# Patient Record
Sex: FEMALE | Race: WHITE | Employment: FULL TIME | URBAN - METROPOLITAN AREA
[De-identification: names, ages, dates, MRNs, and addresses within clinical notes are randomized per-mention and may not be internally consistent; named-entity substitution may affect disease eponyms.]

---

## 2018-10-31 PROCEDURE — 87081 CULTURE SCREEN ONLY: CPT | Performed by: EMERGENCY MEDICINE

## 2018-11-08 ENCOUNTER — OFFICE VISIT (OUTPATIENT)
Dept: OBGYN CLINIC | Facility: CLINIC | Age: 23
End: 2018-11-08
Payer: COMMERCIAL

## 2018-11-08 VITALS
SYSTOLIC BLOOD PRESSURE: 120 MMHG | HEART RATE: 92 BPM | HEIGHT: 69 IN | BODY MASS INDEX: 28.44 KG/M2 | DIASTOLIC BLOOD PRESSURE: 68 MMHG | WEIGHT: 192 LBS

## 2018-11-08 DIAGNOSIS — Z12.4 CERVICAL CANCER SCREENING: ICD-10-CM

## 2018-11-08 DIAGNOSIS — Z01.419 ENCOUNTER FOR WELL WOMAN EXAM WITH ROUTINE GYNECOLOGICAL EXAM: Primary | ICD-10-CM

## 2018-11-08 PROCEDURE — 87491 CHLMYD TRACH DNA AMP PROBE: CPT | Performed by: OBSTETRICS & GYNECOLOGY

## 2018-11-08 PROCEDURE — 87591 N.GONORRHOEAE DNA AMP PROB: CPT | Performed by: OBSTETRICS & GYNECOLOGY

## 2018-11-08 PROCEDURE — 88175 CYTOPATH C/V AUTO FLUID REDO: CPT | Performed by: OBSTETRICS & GYNECOLOGY

## 2018-11-08 PROCEDURE — 99385 PREV VISIT NEW AGE 18-39: CPT | Performed by: OBSTETRICS & GYNECOLOGY

## 2018-11-08 RX ORDER — NORETHINDRONE ACETATE AND ETHINYL ESTRADIOL 1MG-20(21)
1 KIT ORAL DAILY
Qty: 3 PACKAGE | Refills: 3 | Status: SHIPPED | OUTPATIENT
Start: 2018-11-08 | End: 2019-11-20 | Stop reason: ALTCHOICE

## 2018-11-09 NOTE — PROGRESS NOTES
Eben Aguilar is a 24year old female [facility] Patient's last menstrual period was 10/21/2018 (exact date). Patient presents with:  Wellness Visit  Contraception: options  . Patient has no complaints, would like to start OCP, never had pap smear befo Bike Helmet: Not Asked        Seat Belt: Yes        Self-Exams: Not Asked    Social History Narrative      Not on file      FAMILY HISTORY:  Family History   Problem Relation Age of Onset   • Hypertension Father    • Colon Cancer Mother    • Dementia Mater distribution, and no lesions  Urethral Meatus:  normal in size, location, without lesions and prolapse  Bladder:  No fullness, masses or tenderness  Vagina:  Normal appearance without lesions, no abnormal discharge  Cervix:  Normal without tenderness on mo

## 2018-12-19 ENCOUNTER — OFFICE VISIT (OUTPATIENT)
Dept: OBGYN CLINIC | Facility: CLINIC | Age: 23
End: 2018-12-19
Payer: COMMERCIAL

## 2018-12-19 VITALS
SYSTOLIC BLOOD PRESSURE: 106 MMHG | HEART RATE: 88 BPM | BODY MASS INDEX: 27.85 KG/M2 | RESPIRATION RATE: 16 BRPM | DIASTOLIC BLOOD PRESSURE: 60 MMHG | HEIGHT: 69 IN | WEIGHT: 188 LBS

## 2018-12-19 DIAGNOSIS — Z30.41 SURVEILLANCE OF PREVIOUSLY PRESCRIBED CONTRACEPTIVE PILL: Primary | ICD-10-CM

## 2018-12-19 DIAGNOSIS — Z30.09 BIRTH CONTROL COUNSELING: ICD-10-CM

## 2018-12-19 PROCEDURE — 99213 OFFICE O/P EST LOW 20 MIN: CPT | Performed by: NURSE PRACTITIONER

## 2018-12-19 NOTE — PROGRESS NOTES
Sean Barillas is a 24year old female. Patient's last menstrual period was 12/12/2018 (approximate). Patient presents with:  Consult: discuss irregular spotting, just started the bc pack x 1 month.          HPI:    Pt was started on OCPs about 1 affect appropriate    IMPRESSION:   Surveillance of previously prescribed contraceptive pill  (primary encounter diagnosis)  Birth control counseling     PLAN:   Discussed Depo Provera, IUD and implant. Discussed risks, benefits, side effects.  Pt would lik

## 2019-01-09 ENCOUNTER — OFFICE VISIT (OUTPATIENT)
Dept: OBGYN CLINIC | Facility: CLINIC | Age: 24
End: 2019-01-09
Payer: COMMERCIAL

## 2019-01-09 VITALS
WEIGHT: 189.88 LBS | BODY MASS INDEX: 28.12 KG/M2 | SYSTOLIC BLOOD PRESSURE: 110 MMHG | DIASTOLIC BLOOD PRESSURE: 78 MMHG | HEART RATE: 64 BPM | RESPIRATION RATE: 14 BRPM | HEIGHT: 69 IN

## 2019-01-09 DIAGNOSIS — Z30.017 INSERTION OF IMPLANTABLE SUBDERMAL CONTRACEPTIVE: ICD-10-CM

## 2019-01-09 DIAGNOSIS — Z30.017 NEXPLANON INSERTION: Primary | ICD-10-CM

## 2019-01-09 LAB — CONTROL LINE PRESENT WITH A CLEAR BACKGROUND (YES/NO): YES YES/NO

## 2019-01-09 PROCEDURE — 81025 URINE PREGNANCY TEST: CPT | Performed by: OBSTETRICS & GYNECOLOGY

## 2019-01-09 PROCEDURE — 11981 INSERTION DRUG DLVR IMPLANT: CPT | Performed by: OBSTETRICS & GYNECOLOGY

## 2019-01-09 NOTE — PROGRESS NOTES
Nexplanon Insertion    Pregnancy Results: negative from urine test   Birth control method(s) used:  OCP  Consent was obtained from the patient. Insertion:  Lot Number   The patient was positioned with her left arm flexed.      2% lidocaine with epinephri

## 2019-11-20 ENCOUNTER — OFFICE VISIT (OUTPATIENT)
Dept: OBGYN CLINIC | Facility: CLINIC | Age: 24
End: 2019-11-20
Payer: COMMERCIAL

## 2019-11-20 VITALS
HEART RATE: 87 BPM | WEIGHT: 202 LBS | HEIGHT: 69 IN | SYSTOLIC BLOOD PRESSURE: 112 MMHG | BODY MASS INDEX: 29.92 KG/M2 | DIASTOLIC BLOOD PRESSURE: 80 MMHG

## 2019-11-20 DIAGNOSIS — Z01.419 ENCOUNTER FOR WELL WOMAN EXAM WITH ROUTINE GYNECOLOGICAL EXAM: Primary | ICD-10-CM

## 2019-11-20 DIAGNOSIS — Z12.4 CERVICAL CANCER SCREENING: ICD-10-CM

## 2019-11-20 DIAGNOSIS — N89.8 VAGINAL ODOR: ICD-10-CM

## 2019-11-20 PROCEDURE — 88175 CYTOPATH C/V AUTO FLUID REDO: CPT | Performed by: OBSTETRICS & GYNECOLOGY

## 2019-11-20 PROCEDURE — 87480 CANDIDA DNA DIR PROBE: CPT | Performed by: OBSTETRICS & GYNECOLOGY

## 2019-11-20 PROCEDURE — 87491 CHLMYD TRACH DNA AMP PROBE: CPT | Performed by: OBSTETRICS & GYNECOLOGY

## 2019-11-20 PROCEDURE — 87660 TRICHOMONAS VAGIN DIR PROBE: CPT | Performed by: OBSTETRICS & GYNECOLOGY

## 2019-11-20 PROCEDURE — 87510 GARDNER VAG DNA DIR PROBE: CPT | Performed by: OBSTETRICS & GYNECOLOGY

## 2019-11-20 PROCEDURE — 99395 PREV VISIT EST AGE 18-39: CPT | Performed by: OBSTETRICS & GYNECOLOGY

## 2019-11-20 PROCEDURE — 87591 N.GONORRHOEAE DNA AMP PROB: CPT | Performed by: OBSTETRICS & GYNECOLOGY

## 2019-11-20 NOTE — PROGRESS NOTES
Alis Guerrero is a 21year old female  No LMP recorded. Patient has had an implant. Patient presents with:  Wellness Visit  .   Patient noticed fishy odor when urinates for the past month, no urgency or frequency, no vaginal itching  OBSTETRI file        Active member of club or organization: Not on file        Attends meetings of clubs or organizations: Not on file        Relationship status: Not on file      Intimate partner violence:        Fear of current or ex partner: Not on file        E anemia, easy bruising or bleeding.       PHYSICAL EXAM:   Constitutional: well developed, well nourished  Head/Face: normocephalic  Neck/Thyroid: thyroid symmetric, no thyromegaly, no nodules, no adenopathy  Lymphatic:no abnormal supraclavicular or axillary

## 2019-11-23 ENCOUNTER — TELEPHONE (OUTPATIENT)
Dept: OBGYN CLINIC | Facility: CLINIC | Age: 24
End: 2019-11-23

## 2019-11-23 NOTE — TELEPHONE ENCOUNTER
Patient calling for test results from 11/20 pt states she was unable to view them on [employer]

## 2019-11-25 NOTE — TELEPHONE ENCOUNTER
Spoke with patient and went over labs done on 11/20. All labs we normal but patient still experiencing symptoms. Before we consider it to be the nexplanon implant  try OTC feminine hygiene product to stabilize vaginal pH.  If symptoms persist to call office

## 2020-09-01 ENCOUNTER — OFFICE VISIT (OUTPATIENT)
Dept: OBGYN CLINIC | Facility: CLINIC | Age: 25
End: 2020-09-01

## 2020-09-01 VITALS
WEIGHT: 199 LBS | SYSTOLIC BLOOD PRESSURE: 107 MMHG | HEART RATE: 72 BPM | DIASTOLIC BLOOD PRESSURE: 72 MMHG | HEIGHT: 69 IN | BODY MASS INDEX: 29.47 KG/M2

## 2020-09-01 DIAGNOSIS — N76.0 VAGINITIS AND VULVOVAGINITIS: Primary | ICD-10-CM

## 2020-09-01 PROCEDURE — 3074F SYST BP LT 130 MM HG: CPT | Performed by: OBSTETRICS & GYNECOLOGY

## 2020-09-01 PROCEDURE — 3078F DIAST BP <80 MM HG: CPT | Performed by: OBSTETRICS & GYNECOLOGY

## 2020-09-01 PROCEDURE — 87510 GARDNER VAG DNA DIR PROBE: CPT | Performed by: OBSTETRICS & GYNECOLOGY

## 2020-09-01 PROCEDURE — 87591 N.GONORRHOEAE DNA AMP PROB: CPT | Performed by: OBSTETRICS & GYNECOLOGY

## 2020-09-01 PROCEDURE — 99213 OFFICE O/P EST LOW 20 MIN: CPT | Performed by: OBSTETRICS & GYNECOLOGY

## 2020-09-01 PROCEDURE — 87660 TRICHOMONAS VAGIN DIR PROBE: CPT | Performed by: OBSTETRICS & GYNECOLOGY

## 2020-09-01 PROCEDURE — 87480 CANDIDA DNA DIR PROBE: CPT | Performed by: OBSTETRICS & GYNECOLOGY

## 2020-09-01 PROCEDURE — 3008F BODY MASS INDEX DOCD: CPT | Performed by: OBSTETRICS & GYNECOLOGY

## 2020-09-01 PROCEDURE — 87491 CHLMYD TRACH DNA AMP PROBE: CPT | Performed by: OBSTETRICS & GYNECOLOGY

## 2020-09-01 RX ORDER — FLUCONAZOLE 150 MG/1
150 TABLET ORAL ONCE
Qty: 1 TABLET | Refills: 0 | Status: SHIPPED | OUTPATIENT
Start: 2020-09-01 | End: 2020-09-01

## 2020-09-01 NOTE — PROGRESS NOTES
Dina Mike is a 31year old female [place] Patient's last menstrual period was 08/26/2020 (exact date). Patient presents with:  Gyn Problem: vaginal itching,   .   Patient c/o vaginal itching, took antibiotics for oral surgery  OBSTETRICS HISTORY organization: Not on file        Attends meetings of clubs or organizations: Not on file        Relationship status: Not on file      Intimate partner violence:        Fear of current or ex partner: Not on file        Emotionally abused: Not on file Future  - VAGINITIS/VAGINOSIS, DNA PROBE; Future

## 2020-09-02 LAB
C TRACH DNA SPEC QL NAA+PROBE: NEGATIVE
N GONORRHOEA DNA SPEC QL NAA+PROBE: NEGATIVE

## 2020-09-05 RX ORDER — METRONIDAZOLE 7.5 MG/G
1 GEL VAGINAL NIGHTLY
Qty: 70 G | Refills: 0 | Status: SHIPPED | OUTPATIENT
Start: 2020-09-05 | End: 2020-09-10

## 2020-09-05 NOTE — PROGRESS NOTES
Freeman Fowler,  Your vaginal swab test was positive for an infection called bacterial vaginosis and yeast. This is generally not harmful, but it can cause increased discharge and irritation, and if left untreated can make you more vulnerable to other more

## 2020-09-05 NOTE — PROGRESS NOTES
[address] [place] [address] ,    Your gonorrhea and chlamydia testing was negative.  Because of the sensitive nature of these tests, they are not results you will see in your MyChart results, but you are welcome to ask for a paper copy at your next visit to the office if

## 2021-12-02 ENCOUNTER — TELEPHONE (OUTPATIENT)
Dept: OBGYN CLINIC | Facility: CLINIC | Age: 26
End: 2021-12-02

## 2021-12-02 NOTE — TELEPHONE ENCOUNTER
Pt called to schedule her wwe and ask about her nexplanon. Pt state she thinks she would be due in Jan 2022 to have it replaced, is that correct? Pt states she has been having some problems and thinks it is because it is expiring. Please advise.

## 2021-12-02 NOTE — TELEPHONE ENCOUNTER
Pt wanted to confirm that Nexplanon could be replaced at time of wwe since she is due. Will verify and call pt back.

## 2021-12-02 NOTE — TELEPHONE ENCOUNTER
I spoke with pt and let her know Dr. Lisy Arredondo can do the Nexplanon removal & insertion with her [employer]. Verbalized understanding.

## 2024-01-11 ENCOUNTER — NURSE TRIAGE (OUTPATIENT)
Age: 29
End: 2024-01-11

## 2024-01-11 NOTE — TELEPHONE ENCOUNTER
"Previous pt of Dr. Murray inquiring when her next colonoscopy is due. She states she was 16 at last visit. Pt moved away long ago and currently resides in [place], [place]. Her chart is completely blank on my end therefore I am unable to assist. Would someone at [facility] check old system for records and update pt please.      Pt's number has changed.             Reason for Disposition   Nursing judgment    Answer Assessment - Initial Assessment Questions  1. REASON FOR CALL or QUESTION: \"What is your reason for calling today?\" or \"How can I best help you?\" or \"What question do you have that I can help answer?\"      Pt inquiring when next Colonoscopy is due.    Protocols used: Information Only Call - No Triage-ADULT-[place]    "

## 2024-01-12 NOTE — TELEPHONE ENCOUNTER
Spoke with pt. She moved to [place] and will be having done where she lives. Informed her she is due at age 29, so this year she should schedule after her [birth year] birthday.

## (undated) NOTE — LETTER
Malcolm Mcgrath YOB: 1995    CONSENT FOR PROCEDURE/SEDATION    1. I authorize the performance upon Malcolm Mcgrath  the following: Nexplanon Insertion    2.  I authorize Dr. Adrian Ortiz DO (and whomever is designated as the doctor’s Relationship to patient: ____________________________________________    Witness: _________________________________________ Date:___________     Physician Signature: _______________________________ Date:___________

## (undated) NOTE — MR AVS SNAPSHOT
After Visit Summary   11/20/2019    Adonis Patel    MRN: [id number]           Visit Information     Date & Time  11/20/2019  1:15 PM Provider  Millie Rueda [facility] [place]  [address]  Dept.  Phone  [phone] Don’t eat while when you’re bored.      EAT THESE FOODS MORE OFTEN: EAT THESE FOODS LESS OFTEN:   Make half your plate fruits and vegetables Highly refined, white starches including white bread, rice and pasta   Eat plenty of protein, keep the fat content l non-emergency, consider your options before heading to an ER.          SAME DAY  APPOINTMENTS  Available at primary care offices      [facility]     Treatment for mild  illness or inj